# Patient Record
Sex: MALE | Race: WHITE | NOT HISPANIC OR LATINO | Employment: FULL TIME | URBAN - METROPOLITAN AREA
[De-identification: names, ages, dates, MRNs, and addresses within clinical notes are randomized per-mention and may not be internally consistent; named-entity substitution may affect disease eponyms.]

---

## 2017-11-17 DIAGNOSIS — M19.041 ARTHRITIS OF RIGHT HAND: Primary | ICD-10-CM

## 2017-11-21 PROBLEM — M19.041 PRIMARY OSTEOARTHRITIS OF RIGHT HAND: Status: ACTIVE | Noted: 2017-11-21

## 2017-11-28 PROBLEM — R52 PAIN: Status: ACTIVE | Noted: 2017-11-28

## 2017-11-28 PROBLEM — M25.60 RANGE OF MOTION DEFICIT: Status: ACTIVE | Noted: 2017-11-28

## 2018-01-10 PROBLEM — M25.60 RANGE OF MOTION DEFICIT: Status: RESOLVED | Noted: 2017-11-28 | Resolved: 2018-01-10

## 2021-05-19 ENCOUNTER — HOSPITAL ENCOUNTER (OUTPATIENT)
Dept: RADIOLOGY | Facility: HOSPITAL | Age: 66
Discharge: HOME | End: 2021-05-19
Attending: PODIATRIST
Payer: COMMERCIAL

## 2021-05-19 DIAGNOSIS — M79.672 PAIN IN LEFT FOOT: ICD-10-CM

## 2021-05-19 PROBLEM — S92.355A CLOSED NONDISPLACED FRACTURE OF FIFTH LEFT METATARSAL BONE: Status: ACTIVE | Noted: 2021-05-19

## 2021-05-19 PROCEDURE — 73630 XR FOOT COMPLETE 3 VIEW LEFT: ICD-10-PCS | Mod: 26,LT,, | Performed by: RADIOLOGY

## 2021-05-19 PROCEDURE — 73630 X-RAY EXAM OF FOOT: CPT | Mod: TC,PO,LT

## 2021-05-19 PROCEDURE — 73630 X-RAY EXAM OF FOOT: CPT | Mod: 26,LT,, | Performed by: RADIOLOGY

## 2022-12-14 PROBLEM — S83.262A PERIPHERAL TEAR OF LATERAL MENISCUS OF LEFT KNEE AS CURRENT INJURY: Status: ACTIVE | Noted: 2022-12-14

## 2024-02-28 PROBLEM — I10 HYPERTENSION: Chronic | Status: ACTIVE | Noted: 2024-02-28

## 2024-02-28 PROBLEM — Z71.89 ACP (ADVANCE CARE PLANNING): Status: ACTIVE | Noted: 2024-02-28

## 2024-02-28 PROBLEM — E87.29 HIGH ANION GAP METABOLIC ACIDOSIS: Status: ACTIVE | Noted: 2024-02-28

## 2024-02-28 PROBLEM — N17.9 ACUTE RENAL FAILURE: Status: ACTIVE | Noted: 2024-02-28

## 2024-03-01 PROBLEM — E83.39 HYPERPHOSPHATEMIA: Status: ACTIVE | Noted: 2024-03-01

## 2024-03-02 PROBLEM — E78.5 HYPERLIPIDEMIA: Status: ACTIVE | Noted: 2024-03-02

## 2024-03-11 PROBLEM — Z71.89 ACP (ADVANCE CARE PLANNING): Status: RESOLVED | Noted: 2024-02-28 | Resolved: 2024-03-11

## 2024-03-26 ENCOUNTER — TELEPHONE (OUTPATIENT)
Dept: NEPHROLOGY | Facility: CLINIC | Age: 69
End: 2024-03-26
Payer: MEDICARE

## 2024-03-26 DIAGNOSIS — N10 ACUTE INTERSTITIAL NEPHRITIS: Primary | ICD-10-CM

## 2024-03-26 NOTE — PROGRESS NOTES
Nephrology Plan of Care Note    Pt: Colton Ramachandran  : 1955  Date: 2024      Pre-visit labs orders placed      Johan Mtz MD  [facility] [place]

## 2024-03-26 NOTE — TELEPHONE ENCOUNTER
----- Message from Johan Mtz MD sent at 3/26/2024  4:37 PM CDT -----  Regarding: Coming off dialysis!!!  Mr. Ramachandran is coming off dialysis with renal recovery!!!    He will need a follow up with me in 3-4 weeks with labs prior to next visit. I placed his lab orders just now.     Thanks all,    ATR

## 2024-04-02 ENCOUNTER — OFFICE VISIT (OUTPATIENT)
Dept: VASCULAR SURGERY | Facility: CLINIC | Age: 69
End: 2024-04-02
Payer: MEDICARE

## 2024-04-02 DIAGNOSIS — N18.6 ESRD (END STAGE RENAL DISEASE): Primary | ICD-10-CM

## 2024-04-02 PROCEDURE — 36589 REMOVAL TUNNELED CV CATH: CPT | Mod: S$GLB,,, | Performed by: STUDENT IN AN ORGANIZED HEALTH CARE EDUCATION/TRAINING PROGRAM

## 2024-04-02 PROCEDURE — 4010F ACE/ARB THERAPY RXD/TAKEN: CPT | Mod: CPTII,S$GLB,, | Performed by: STUDENT IN AN ORGANIZED HEALTH CARE EDUCATION/TRAINING PROGRAM

## 2024-04-08 NOTE — PROGRESS NOTES
[facility]  Vascular Surgery  Procedure Note    SUMMARY       Procedure:   Removal of a right internal jugular tunneled catheter    * Surgery not found *    @ORCONDITIONALSMARTLINK(RONALDOLE,NCT,1,[facility])@    Pre-Operative Diagnosis:  ESRD    Post-Operative Diagnosis:   Same    Anesthesia:   Local - 1% Lidocaine    Description of Technical Procedures:   Consent was obtained prior to performing this procedure.  Patient is no longer need of the catheter so the plan is to remove the catheter to avoid any risk of infection and potential central stenosis    Patient's right chest and neck was prepped and draped in sterile fashion.  1% lidocaine was infiltrated into surrounding tissue.  With blunt dissection and spreading with scissors we are able to separate the cuff of the catheter from surrounding tissue.  The catheter was then removed and pressure was held at the base of the right neck.  The tip of the catheter was confirmed that it was retrieved.  The exit site was packed with a piece of gauze to prevent bleeding.  A pressure dressing was placed at the base of the neck.    Estimated Blood Loss (EBL): minimal    Specimens: none           Condition: Good    Disposition:   Patient was instructed to keep the pressure dressing on his neck for 24 hours and avoid showering during this period of time.  After 24 hours he can remove the packing from the exit site and the pressure dressing.  He was also advised to not lay supine for this 24 hour period to ensure the hemostasis is achieved.  Also instructed him that if there was any bleeding from the exit site to hold pressure at the base of the neck.    Follow up as needed

## 2024-04-23 ENCOUNTER — OFFICE VISIT (OUTPATIENT)
Dept: NEPHROLOGY | Facility: CLINIC | Age: 69
End: 2024-04-23
Payer: MEDICARE

## 2024-04-23 ENCOUNTER — TELEPHONE (OUTPATIENT)
Dept: NEPHROLOGY | Facility: CLINIC | Age: 69
End: 2024-04-23

## 2024-04-23 VITALS
BODY MASS INDEX: 42.96 KG/M2 | HEIGHT: 70 IN | HEART RATE: 72 BPM | OXYGEN SATURATION: 97 % | WEIGHT: 300.06 LBS | DIASTOLIC BLOOD PRESSURE: 72 MMHG | SYSTOLIC BLOOD PRESSURE: 150 MMHG

## 2024-04-23 DIAGNOSIS — I10 HYPERTENSION, UNSPECIFIED TYPE: Chronic | ICD-10-CM

## 2024-04-23 DIAGNOSIS — N10 ACUTE INTERSTITIAL NEPHRITIS: Primary | ICD-10-CM

## 2024-04-23 DIAGNOSIS — E66.01 CLASS 3 OBESITY: ICD-10-CM

## 2024-04-23 DIAGNOSIS — E78.5 HYPERLIPIDEMIA, UNSPECIFIED HYPERLIPIDEMIA TYPE: ICD-10-CM

## 2024-04-23 PROCEDURE — 4010F ACE/ARB THERAPY RXD/TAKEN: CPT | Mod: CPTII,S$GLB,, | Performed by: STUDENT IN AN ORGANIZED HEALTH CARE EDUCATION/TRAINING PROGRAM

## 2024-04-23 PROCEDURE — 99214 OFFICE O/P EST MOD 30 MIN: CPT | Mod: S$GLB,,, | Performed by: STUDENT IN AN ORGANIZED HEALTH CARE EDUCATION/TRAINING PROGRAM

## 2024-04-23 PROCEDURE — 1101F PT FALLS ASSESS-DOCD LE1/YR: CPT | Mod: CPTII,S$GLB,, | Performed by: STUDENT IN AN ORGANIZED HEALTH CARE EDUCATION/TRAINING PROGRAM

## 2024-04-23 PROCEDURE — 3077F SYST BP >= 140 MM HG: CPT | Mod: CPTII,S$GLB,, | Performed by: STUDENT IN AN ORGANIZED HEALTH CARE EDUCATION/TRAINING PROGRAM

## 2024-04-23 PROCEDURE — 3008F BODY MASS INDEX DOCD: CPT | Mod: CPTII,S$GLB,, | Performed by: STUDENT IN AN ORGANIZED HEALTH CARE EDUCATION/TRAINING PROGRAM

## 2024-04-23 PROCEDURE — 3066F NEPHROPATHY DOC TX: CPT | Mod: CPTII,S$GLB,, | Performed by: STUDENT IN AN ORGANIZED HEALTH CARE EDUCATION/TRAINING PROGRAM

## 2024-04-23 PROCEDURE — 3078F DIAST BP <80 MM HG: CPT | Mod: CPTII,S$GLB,, | Performed by: STUDENT IN AN ORGANIZED HEALTH CARE EDUCATION/TRAINING PROGRAM

## 2024-04-23 PROCEDURE — 3288F FALL RISK ASSESSMENT DOCD: CPT | Mod: CPTII,S$GLB,, | Performed by: STUDENT IN AN ORGANIZED HEALTH CARE EDUCATION/TRAINING PROGRAM

## 2024-04-23 PROCEDURE — 1159F MED LIST DOCD IN RCRD: CPT | Mod: CPTII,S$GLB,, | Performed by: STUDENT IN AN ORGANIZED HEALTH CARE EDUCATION/TRAINING PROGRAM

## 2024-04-23 PROCEDURE — 99999 PR PBB SHADOW E&M-EST. PATIENT-LVL III: CPT | Mod: PBBFAC,,, | Performed by: STUDENT IN AN ORGANIZED HEALTH CARE EDUCATION/TRAINING PROGRAM

## 2024-04-23 RX ORDER — OXYCODONE AND ACETAMINOPHEN 10; 325 MG/1; MG/1
1 TABLET ORAL EVERY 8 HOURS PRN
COMMUNITY
Start: 2024-04-18

## 2024-04-23 RX ORDER — PREDNISONE 5 MG/1
TABLET ORAL
Qty: 16 TABLET | Refills: 0 | Status: SHIPPED | OUTPATIENT
Start: 2024-04-23 | End: 2024-06-13

## 2024-04-23 NOTE — PROGRESS NOTES
Subjective:       Patient ID: Colton Ramachandran is a 69 y.o. White male who presents for new patient evaluation for chronic renal failure.  He was initially seen by the inpatient nephrology consult service in February of this year where he presented at request of his physician for abnormal labs.  In the emergency department his BUN/creatinine was 115/13.6 mg/dL, which was rapidly progressive over the course of the previous year.  He was initiated on hemodialysis after tunneled catheter placement during this hospital admission to stabilize the patient prior to CT-guided kidney biopsy on March 4, 2024.  He was then transitioned to the outpatient dialysis clinic for continued care.  His renal biopsy returned with acute interstitial nephritis and ATN.  He was placed on steroids with resultant recovery of renal function over the next few weeks.  He was discharged from dialysis at the end of March with improvement in recovery renal function.  Patient presents today for interval follow up.  He remains on steroid taper at this time and we will continue to down titrate over the next few weeks.    For acute interstitial nephritis-renal function is spontaneous recovering with steroids.  We believe is interstitial nephritis was secondary to aspirin, which was started in the year prior to his presentation.  The rest of his medicine list is typically benign for AIN causative pharmacologics.     Last lab was on 3/22/24 at [facility], BUN/Cr was 26/1.18mg/dL prior to dialysis treatment.       Review of Systems   Constitutional:  Negative for chills, diaphoresis and fever.   Respiratory:  Negative for cough and shortness of breath.    Cardiovascular:  Negative for chest pain and leg swelling.   Gastrointestinal:  Negative for abdominal pain, diarrhea, nausea and vomiting.   Genitourinary:  Negative for difficulty urinating, dysuria and hematuria.   Musculoskeletal:  Negative for myalgias.   Neurological:  Negative for headaches.    Hematological:  Does not bruise/bleed easily.   All other systems reviewed and are negative.      The past medical, family and social histories were reviewed for this encounter.     Past Medical History:   Diagnosis Date    Arthritis     Gout, unspecified     Hypertension      Past Surgical History:   Procedure Laterality Date    ANKLE FRACTURE SURGERY Right     HAND SURGERY Right     thumb    INSERTION, CATHETER, TUNNELED N/A 2/29/2024    Procedure: Insertion,catheter,tunneled;  Surgeon: Clint Shepherd MD;  Location: [facility];  Service: General;  Laterality: N/A;    KNEE ARTHROSCOPY W/ MENISCECTOMY Left 12/14/2022    Procedure: ARTHROSCOPY, KNEE, WITH PARTIAL LATERAL MENISCECTOMY;  Surgeon: Clint Garcia MD;  Location: [facility] [place] [facility];  Service: Orthopedics;  Laterality: Left;    LIPOMA RESECTION      mass from chest 1980s    TONSILLECTOMY       Social History     Socioeconomic History    Marital status: Legally    Tobacco Use    Smoking status: Never    Smokeless tobacco: Never   Substance and Sexual Activity    Alcohol use: Yes     Alcohol/week: 2.0 standard drinks of alcohol     Types: 2 Shots of liquor per week     Comment: occasionally    Drug use: Yes     Types: Hydrocodone, Oxycodone     Social Determinants of Health     Food Insecurity: No Food Insecurity (3/1/2024)    Hunger Vital Sign     Worried About Running Out of Food in the Last Year: Never true     Ran Out of Food in the Last Year: Never true   Transportation Needs: No Transportation Needs (3/1/2024)    PRAPARE - Transportation     Lack of Transportation (Medical): No     Lack of Transportation (Non-Medical): No   Housing Stability: Low Risk  (3/1/2024)    Housing Stability Vital Sign     Unable to Pay for Housing in the Last Year: No     Number of Places Lived in the Last Year: 1     Unstable Housing in the Last Year: No     Current Outpatient Medications   Medication Sig Dispense Refill    metoprolol tartrate (LOPRESSOR) 50 MG tablet Take 50  "mg by mouth 2 (two) times daily.      oxyCODONE-acetaminophen (PERCOCET)  mg per tablet Take 1 tablet by mouth every 8 (eight) hours as needed.      rosuvastatin (CRESTOR) 40 MG Tab Take 40 mg by mouth every evening.      predniSONE (DELTASONE) 5 MG tablet Take 2 tablets (10 mg total) by mouth every other day for 10 days, THEN 1 tablet (5 mg total) every other day for 10 days, THEN 0.5 tablets (2.5 mg total) every other day for 4 days. 16 tablet 0    sildenafiL (VIAGRA) 100 MG tablet Take 100 mg by mouth daily as needed for Erectile Dysfunction. (Patient not taking: Reported on 4/23/2024)       No current facility-administered medications for this visit.     BP (!) 150/72 (BP Location: Left arm, Patient Position: Sitting, BP Method: Large (Manual))   Pulse 72   Ht 5' 10" (1.778 m)   Wt (!) 136.1 kg (300 lb 0.7 oz)   SpO2 97%   BMI 43.05 kg/m²     Objective:      Physical Exam  Vitals reviewed.   Constitutional:       General: He is not in acute distress.     Appearance: Normal appearance.   HENT:      Head: Normocephalic and atraumatic.      Right Ear: External ear normal.      Left Ear: External ear normal.      Nose: Nose normal. No congestion.      Mouth/Throat:      Mouth: Mucous membranes are moist.      Pharynx: Oropharynx is clear. No oropharyngeal exudate or posterior oropharyngeal erythema.   Eyes:      General: No scleral icterus.     Extraocular Movements: Extraocular movements intact.   Cardiovascular:      Rate and Rhythm: Normal rate and regular rhythm.      Pulses: Normal pulses.      Heart sounds: Normal heart sounds.      No friction rub.   Pulmonary:      Effort: Pulmonary effort is normal. No respiratory distress.      Breath sounds: Normal breath sounds.   Abdominal:      General: Abdomen is flat.      Palpations: Abdomen is soft.   Musculoskeletal:         General: No swelling.      Cervical back: Normal range of motion. No tenderness.      Right lower leg: No edema.      Left lower " "leg: No edema.   Neurological:      General: No focal deficit present.      Mental Status: He is oriented to person, place, and time.      Motor: No weakness.   Psychiatric:         Mood and Affect: Mood normal.         Behavior: Behavior normal.         Assessment:     Lab Results   Component Value Date    CREATININE 3.52 (H) 03/05/2024    BUN 44 (H) 03/05/2024     03/05/2024    K 3.6 03/05/2024    CL 99 03/05/2024    CO2 26 03/05/2024     Lab Results   Component Value Date    .7 (H) 03/01/2024    CALCIUM 9.1 03/05/2024    PHOS 4.6 (H) 03/05/2024     Lab Results   Component Value Date    HCT 36.0 (L) 03/02/2024     Prot/Creat Ratio, Urine   Date Value Ref Range Status   02/28/2024 436.2 (H) 15.0 - 68.0 mg/g Final       No results found for: "MICALBCREAT"        1. Acute interstitial nephritis    2. Hypertension, unspecified type    3. Hyperlipidemia, unspecified hyperlipidemia type    4. Class 3 obesity        Plan:   Return to clinic in 6 weeks.  Labs for next visit include UACR and RFP  Baseline creatinine is 1.0mg/dL-1.2mg/dL.    Acute interstitial nephritis   -biopsy-proven 03/04/2024.  Believed secondary to aspirin use.  On steroid taper however rapidly tapering down.    -was able to come off dialysis by the end of March 2024  -continue to follow.  Return in 6 weeks with additional labs and urine protein assessment for staging    Hypertension   -okay with permissive hypertension at this time as he is currently tapering down steroids.  We will have him return off steroids to evaluate for underlying hypertension and medication needs    Hyperlipidemia-continue Crestor.  We discussed Mediterranean diet today.    Class 3 obesity- We discussed making lifestyle changes and using a Mediterranean diet for health benefits and weight loss.  This diet also is beneficial in improving HTN, DM, protein in urine as well as kidney function. aerobic exercise at least 3x weekly as tolerated      Johan Mtz, " MD [facility] [place]

## 2024-04-29 PROBLEM — N10 ACUTE INTERSTITIAL NEPHRITIS: Status: ACTIVE | Noted: 2024-04-29

## 2024-04-29 PROBLEM — E66.813 CLASS 3 OBESITY: Status: ACTIVE | Noted: 2024-04-29

## 2024-04-29 PROBLEM — E66.01 CLASS 3 OBESITY: Status: ACTIVE | Noted: 2024-04-29

## 2024-05-09 LAB
ALBUMIN SERPL-MCNC: 4 G/DL (ref 3.6–5.1)
ALBUMIN/CREAT UR: 9 MG/G CREAT
BUN SERPL-MCNC: 19 MG/DL (ref 7–25)
BUN/CREAT SERPL: NORMAL (CALC) (ref 6–22)
CALCIUM SERPL-MCNC: 9 MG/DL (ref 8.6–10.3)
CHLORIDE SERPL-SCNC: 102 MMOL/L (ref 98–110)
CO2 SERPL-SCNC: 29 MMOL/L (ref 20–32)
CREAT SERPL-MCNC: 1.28 MG/DL (ref 0.7–1.35)
CREAT UR-MCNC: 172 MG/DL (ref 20–320)
EGFR: 61 ML/MIN/1.73M2
GLUCOSE SERPL-MCNC: 98 MG/DL (ref 65–99)
MICROALBUMIN UR-MCNC: 1.5 MG/DL
PHOSPHATE SERPL-MCNC: 3.4 MG/DL (ref 2.1–4.3)
POTASSIUM SERPL-SCNC: 4.2 MMOL/L (ref 3.5–5.3)
SODIUM SERPL-SCNC: 139 MMOL/L (ref 135–146)

## 2024-06-03 PROBLEM — N17.9 AKI (ACUTE KIDNEY INJURY): Status: RESOLVED | Noted: 2024-02-28 | Resolved: 2024-06-03

## 2024-06-07 ENCOUNTER — TELEPHONE (OUTPATIENT)
Dept: NEPHROLOGY | Facility: CLINIC | Age: 69
End: 2024-06-07
Payer: MEDICARE

## 2024-06-07 NOTE — TELEPHONE ENCOUNTER
----- Message from Gill Cantu sent at 6/7/2024 12:45 PM CDT -----  Regarding: lab orders  Contact: pt  Type:  Needs Medical Advice    Who Called: pt  Would the patient rather a call back or a response via [facility]? Call back  Best Call Back Number: [phone]    Additional Information: he wants to know does he need to have more blood work done before his appt on 6/13/24 if so he needs orders

## 2024-06-13 ENCOUNTER — TELEPHONE (OUTPATIENT)
Dept: NEPHROLOGY | Facility: CLINIC | Age: 69
End: 2024-06-13

## 2024-06-13 ENCOUNTER — OFFICE VISIT (OUTPATIENT)
Dept: NEPHROLOGY | Facility: CLINIC | Age: 69
End: 2024-06-13
Payer: MEDICARE

## 2024-06-13 VITALS
HEART RATE: 71 BPM | SYSTOLIC BLOOD PRESSURE: 150 MMHG | OXYGEN SATURATION: 97 % | HEIGHT: 70 IN | DIASTOLIC BLOOD PRESSURE: 84 MMHG | BODY MASS INDEX: 43.05 KG/M2

## 2024-06-13 DIAGNOSIS — E66.01 CLASS 3 OBESITY: ICD-10-CM

## 2024-06-13 DIAGNOSIS — R79.89 ELEVATED SERUM CREATININE: ICD-10-CM

## 2024-06-13 DIAGNOSIS — N10 ACUTE INTERSTITIAL NEPHRITIS: Primary | ICD-10-CM

## 2024-06-13 PROCEDURE — 3066F NEPHROPATHY DOC TX: CPT | Mod: CPTII,S$GLB,, | Performed by: STUDENT IN AN ORGANIZED HEALTH CARE EDUCATION/TRAINING PROGRAM

## 2024-06-13 PROCEDURE — 3008F BODY MASS INDEX DOCD: CPT | Mod: CPTII,S$GLB,, | Performed by: STUDENT IN AN ORGANIZED HEALTH CARE EDUCATION/TRAINING PROGRAM

## 2024-06-13 PROCEDURE — 1101F PT FALLS ASSESS-DOCD LE1/YR: CPT | Mod: CPTII,S$GLB,, | Performed by: STUDENT IN AN ORGANIZED HEALTH CARE EDUCATION/TRAINING PROGRAM

## 2024-06-13 PROCEDURE — 1159F MED LIST DOCD IN RCRD: CPT | Mod: CPTII,S$GLB,, | Performed by: STUDENT IN AN ORGANIZED HEALTH CARE EDUCATION/TRAINING PROGRAM

## 2024-06-13 PROCEDURE — 3061F NEG MICROALBUMINURIA REV: CPT | Mod: CPTII,S$GLB,, | Performed by: STUDENT IN AN ORGANIZED HEALTH CARE EDUCATION/TRAINING PROGRAM

## 2024-06-13 PROCEDURE — 99999 PR PBB SHADOW E&M-EST. PATIENT-LVL II: CPT | Mod: PBBFAC,,, | Performed by: STUDENT IN AN ORGANIZED HEALTH CARE EDUCATION/TRAINING PROGRAM

## 2024-06-13 PROCEDURE — 3077F SYST BP >= 140 MM HG: CPT | Mod: CPTII,S$GLB,, | Performed by: STUDENT IN AN ORGANIZED HEALTH CARE EDUCATION/TRAINING PROGRAM

## 2024-06-13 PROCEDURE — 3079F DIAST BP 80-89 MM HG: CPT | Mod: CPTII,S$GLB,, | Performed by: STUDENT IN AN ORGANIZED HEALTH CARE EDUCATION/TRAINING PROGRAM

## 2024-06-13 PROCEDURE — 99213 OFFICE O/P EST LOW 20 MIN: CPT | Mod: S$GLB,,, | Performed by: STUDENT IN AN ORGANIZED HEALTH CARE EDUCATION/TRAINING PROGRAM

## 2024-06-13 PROCEDURE — 4010F ACE/ARB THERAPY RXD/TAKEN: CPT | Mod: CPTII,S$GLB,, | Performed by: STUDENT IN AN ORGANIZED HEALTH CARE EDUCATION/TRAINING PROGRAM

## 2024-06-13 PROCEDURE — 3288F FALL RISK ASSESSMENT DOCD: CPT | Mod: CPTII,S$GLB,, | Performed by: STUDENT IN AN ORGANIZED HEALTH CARE EDUCATION/TRAINING PROGRAM

## 2024-06-13 NOTE — PROGRESS NOTES
Subjective:       Patient ID: Colton Ramachandran is a 69 y.o. White male who presents for new patient evaluation for chronic renal failure.  He was initially seen by the inpatient nephrology consult service in February of this year where he presented at request of his physician for abnormal labs.  In the emergency department his BUN/creatinine was 115/13.6 mg/dL, which was rapidly progressive over the course of the previous year.  He was initiated on hemodialysis after tunneled catheter placement during this hospital admission to stabilize the patient prior to CT-guided kidney biopsy on March 4, 2024.  He was then transitioned to the outpatient dialysis clinic for continued care.  His renal biopsy returned with acute interstitial nephritis and ATN.  He was placed on steroids with resultant recovery of renal function over the next few weeks.  He was discharged from dialysis at the end of March with improvement in recovery renal function.  Patient presents today for interval follow up.  He remains on steroid taper at this time and we will continue to down titrate over the next few weeks.    For acute interstitial nephritis-renal function is spontaneous recovering with steroids.  We believe is interstitial nephritis was secondary to aspirin, which was started in the year prior to his presentation.  The rest of his medicine list is typically benign for AIN causative pharmacologics.     Last lab was on 3/22/24 at [facility], BUN/Cr was 26/1.18mg/dL prior to dialysis treatment.     Interval history:   06/13/2024 clinic visit-patient is doing well today.  He voices no new medical complaints since last visit.  He had issues with the lab collection and was only able to obtain a renal function panel and urine albumin to creatinine ratio.  Serum creatinine is 1.28 mg/dL with an EGFR of 61 per the CKD epi 2021 equation.  When factoring in his height and weight however his GFR approaches 89 mL a minute per the CKD epi 2021  equation.  His UA CR is undetectable. no other complaints today.    Review of Systems   Constitutional:  Negative for chills, diaphoresis and fever.   Respiratory:  Negative for cough and shortness of breath.    Cardiovascular:  Negative for chest pain and leg swelling.   Gastrointestinal:  Negative for abdominal pain, diarrhea, nausea and vomiting.   Genitourinary:  Negative for difficulty urinating, dysuria and hematuria.   Musculoskeletal:  Negative for myalgias.   Neurological:  Negative for headaches.   Hematological:  Does not bruise/bleed easily.   All other systems reviewed and are negative.      The past medical, family and social histories were reviewed for this encounter.     Past Medical History:   Diagnosis Date    Arthritis     Gout, unspecified     Hypertension      Past Surgical History:   Procedure Laterality Date    ANKLE FRACTURE SURGERY Right     HAND SURGERY Right     thumb    INSERTION, CATHETER, TUNNELED N/A 2/29/2024    Procedure: Insertion,catheter,tunneled;  Surgeon: Clint Shepherd MD;  Location: [facility];  Service: General;  Laterality: N/A;    KNEE ARTHROSCOPY W/ MENISCECTOMY Left 12/14/2022    Procedure: ARTHROSCOPY, KNEE, WITH PARTIAL LATERAL MENISCECTOMY;  Surgeon: Clint Garcia MD;  Location: [facility];  Service: Orthopedics;  Laterality: Left;    LIPOMA RESECTION      mass from chest 1980s    TONSILLECTOMY       Social History     Socioeconomic History    Marital status: Legally    Tobacco Use    Smoking status: Never    Smokeless tobacco: Never   Substance and Sexual Activity    Alcohol use: Yes     Alcohol/week: 2.0 standard drinks of alcohol     Types: 2 Shots of liquor per week     Comment: occasionally    Drug use: Yes     Types: Hydrocodone, Oxycodone     Social Determinants of Health     Food Insecurity: No Food Insecurity (3/1/2024)    Hunger Vital Sign     Worried About Running Out of Food in the Last Year: Never true     Ran Out of Food in the Last Year: Never true  "  Transportation Needs: No Transportation Needs (3/1/2024)    PRAPARE - Transportation     Lack of Transportation (Medical): No     Lack of Transportation (Non-Medical): No   Housing Stability: Low Risk  (3/1/2024)    Housing Stability Vital Sign     Unable to Pay for Housing in the Last Year: No     Number of Places Lived in the Last Year: 1     Unstable Housing in the Last Year: No     Current Outpatient Medications   Medication Sig    metoprolol tartrate (LOPRESSOR) 50 MG tablet Take 50 mg by mouth 2 (two) times daily.    oxyCODONE-acetaminophen (PERCOCET)  mg per tablet Take 1 tablet by mouth every 8 (eight) hours as needed.    rosuvastatin (CRESTOR) 40 MG Tab Take 40 mg by mouth every evening.    sildenafiL (VIAGRA) 100 MG tablet Take 100 mg by mouth daily as needed for Erectile Dysfunction.     No current facility-administered medications for this visit.     BP (!) 150/84 (BP Location: Left arm, Patient Position: Sitting, BP Method: Large (Manual))   Pulse 71   Ht 5' 10" (1.778 m)   SpO2 97%   BMI 43.05 kg/m²     Objective:      Physical Exam  Vitals reviewed.   Constitutional:       General: He is not in acute distress.     Appearance: Normal appearance. He is obese.   HENT:      Head: Normocephalic and atraumatic.   Eyes:      General: No scleral icterus.     Extraocular Movements: Extraocular movements intact.   Cardiovascular:      Rate and Rhythm: Normal rate and regular rhythm.      Pulses: Normal pulses.      Heart sounds: Normal heart sounds.      No friction rub.   Pulmonary:      Effort: Pulmonary effort is normal. No respiratory distress.      Breath sounds: Normal breath sounds.   Abdominal:      General: Abdomen is flat.      Palpations: Abdomen is soft.   Musculoskeletal:         General: No swelling.      Cervical back: Normal range of motion. No tenderness.      Right lower leg: No edema.      Left lower leg: No edema.   Neurological:      General: No focal deficit present.      " Mental Status: He is oriented to person, place, and time.      Motor: No weakness.   Psychiatric:         Mood and Affect: Mood normal.         Behavior: Behavior normal.         Assessment:     Lab Results   Component Value Date    CREATININE 1.28 05/08/2024    BUN 19 05/08/2024     05/08/2024    K 4.2 05/08/2024     05/08/2024    CO2 29 05/08/2024     Lab Results   Component Value Date    .7 (H) 03/01/2024    CALCIUM 9.0 05/08/2024    PHOS 4.6 (H) 03/05/2024     Lab Results   Component Value Date    HCT 36.0 (L) 03/02/2024     Prot/Creat Ratio, Urine   Date Value Ref Range Status   02/28/2024 436.2 (H) 15.0 - 68.0 mg/g Final       Lab Results   Component Value Date    MICALBCREAT 9 05/08/2024           1. Acute interstitial nephritis    2. Class 3 obesity    3. Elevated serum creatinine          Plan:   Return to clinic in 12 months  Labs for next visit include RF P, PTH, CBC, UA CR, U PCR, UA  Baseline creatinine is 1.0mg/dL-1.2mg/dL.    Acute interstitial nephritis   -biopsy-proven 03/04/2024.  Believed secondary to aspirin use.  On steroid taper however rapidly tapering down.    -was able to come off dialysis by the end of March 2024  -renal function at baseline.  EGFR based on his weight is 89 mL a minute without presence of albuminuria.  He does not meet traditional criteria for chronic kidney disease.    Hypertension   -off steroids.  Reports his blood pressure at home is well-controlled.  We will continue to monitor his blood pressure trends.  He can follow up PCP for further titration as well.  Reasonable to start him on a low-dose calcium channel blocker and follow up visit if he remains elevated.    Hyperlipidemia-continue Crestor.  We discussed Mediterranean diet today.    Class 3 obesity- We discussed making lifestyle changes and using a Mediterranean diet for health benefits and weight loss.  This diet also is beneficial in improving HTN, DM, protein in urine as well as kidney  function. aerobic exercise at least 3x weekly as tolerated      Johan Mtz MD  [facility]

## 2024-06-27 ENCOUNTER — TELEPHONE (OUTPATIENT)
Dept: NEPHROLOGY | Facility: CLINIC | Age: 69
End: 2024-06-27
Payer: MEDICARE

## 2024-06-27 NOTE — TELEPHONE ENCOUNTER
----- Message from Quique Posada sent at 6/27/2024 12:18 PM CDT -----  Type:  Needs Medical Advice    Who Called: PT    Symptoms (please be specific): Gout       Would the patient rather a call back or a response via [facility]? Call back    Best Call Back Number: [phone]      Additional Information: Sts at last visit Dr Mtz told him of an OTC medicine he can get for gout and he forgot what it was wants to know if he can get a call back to tell him.   Please advise -- Thank you

## 2024-06-27 NOTE — TELEPHONE ENCOUNTER
I emailed patient's after visit summary to the patient frol the last follow up. It contained the information regarding the OTC medication.Addressed.

## 2025-06-16 ENCOUNTER — OFFICE VISIT (OUTPATIENT)
Dept: NEPHROLOGY | Facility: CLINIC | Age: 70
End: 2025-06-16
Payer: MEDICARE

## 2025-06-16 VITALS
SYSTOLIC BLOOD PRESSURE: 150 MMHG | HEART RATE: 70 BPM | OXYGEN SATURATION: 96 % | HEIGHT: 70 IN | WEIGHT: 300.06 LBS | DIASTOLIC BLOOD PRESSURE: 100 MMHG | BODY MASS INDEX: 42.96 KG/M2

## 2025-06-16 DIAGNOSIS — E78.5 HYPERLIPIDEMIA, UNSPECIFIED HYPERLIPIDEMIA TYPE: ICD-10-CM

## 2025-06-16 DIAGNOSIS — R79.89 ELEVATED SERUM CREATININE: Primary | ICD-10-CM

## 2025-06-16 DIAGNOSIS — N10 ACUTE INTERSTITIAL NEPHRITIS: ICD-10-CM

## 2025-06-16 DIAGNOSIS — E66.01 MORBID (SEVERE) OBESITY DUE TO EXCESS CALORIES: ICD-10-CM

## 2025-06-16 DIAGNOSIS — I10 HYPERTENSION, UNSPECIFIED TYPE: ICD-10-CM

## 2025-06-16 PROCEDURE — 3077F SYST BP >= 140 MM HG: CPT | Mod: CPTII,S$GLB,, | Performed by: STUDENT IN AN ORGANIZED HEALTH CARE EDUCATION/TRAINING PROGRAM

## 2025-06-16 PROCEDURE — 3066F NEPHROPATHY DOC TX: CPT | Mod: CPTII,S$GLB,, | Performed by: STUDENT IN AN ORGANIZED HEALTH CARE EDUCATION/TRAINING PROGRAM

## 2025-06-16 PROCEDURE — 3008F BODY MASS INDEX DOCD: CPT | Mod: CPTII,S$GLB,, | Performed by: STUDENT IN AN ORGANIZED HEALTH CARE EDUCATION/TRAINING PROGRAM

## 2025-06-16 PROCEDURE — 99214 OFFICE O/P EST MOD 30 MIN: CPT | Mod: S$GLB,,, | Performed by: STUDENT IN AN ORGANIZED HEALTH CARE EDUCATION/TRAINING PROGRAM

## 2025-06-16 PROCEDURE — 99999 PR PBB SHADOW E&M-EST. PATIENT-LVL III: CPT | Mod: PBBFAC,,, | Performed by: STUDENT IN AN ORGANIZED HEALTH CARE EDUCATION/TRAINING PROGRAM

## 2025-06-16 PROCEDURE — 3080F DIAST BP >= 90 MM HG: CPT | Mod: CPTII,S$GLB,, | Performed by: STUDENT IN AN ORGANIZED HEALTH CARE EDUCATION/TRAINING PROGRAM

## 2025-06-16 PROCEDURE — 3060F POS MICROALBUMINURIA REV: CPT | Mod: CPTII,S$GLB,, | Performed by: STUDENT IN AN ORGANIZED HEALTH CARE EDUCATION/TRAINING PROGRAM

## 2025-06-16 PROCEDURE — 1159F MED LIST DOCD IN RCRD: CPT | Mod: CPTII,S$GLB,, | Performed by: STUDENT IN AN ORGANIZED HEALTH CARE EDUCATION/TRAINING PROGRAM

## 2025-06-16 RX ORDER — LOSARTAN POTASSIUM AND HYDROCHLOROTHIAZIDE 12.5; 5 MG/1; MG/1
1 TABLET ORAL DAILY
Qty: 30 TABLET | Refills: 11 | Status: SHIPPED | OUTPATIENT
Start: 2025-06-16 | End: 2026-06-11

## 2025-06-16 NOTE — PROGRESS NOTES
[facility]  Nephrology Clinic    Subjective:       HPI ID: Colton Ramachandran is a 70 y.o. male who presents for new patient evaluation for chronic renal failure.  He was initially seen by the inpatient nephrology consult service in February of this year where he presented at request of his physician for abnormal labs.  In the emergency department his BUN/creatinine was 115/13.6 mg/dL, which was rapidly progressive over the course of the previous year.  He was initiated on hemodialysis after tunneled catheter placement during this hospital admission to stabilize the patient prior to CT-guided kidney biopsy on March 4, 2024.  He was then transitioned to the outpatient dialysis clinic for continued care.  His renal biopsy returned with acute interstitial nephritis and ATN.  He was placed on steroids with resultant recovery of renal function over the next few weeks.  He was discharged from dialysis at the end of March with improvement in recovery renal function.  Patient presents today for interval follow up.  He remains on steroid taper at this time and we will continue to down titrate over the next few weeks.    For acute interstitial nephritis-renal function is spontaneous recovering with steroids.  We believe is interstitial nephritis was secondary to aspirin, which was started in the year prior to his presentation.  The rest of his medicine list is typically benign for AIN causative pharmacologics.     Last lab was on 3/22/24 at [facility], BUN/Cr was 26/1.18mg/dL prior to dialysis treatment.     Interval history:   06/13/2024 clinic visit-patient is doing well today.  He voices no new medical complaints since last visit.  He had issues with the lab collection and was only able to obtain a renal function panel and urine albumin to creatinine ratio.  Serum creatinine is 1.28 mg/dL with an EGFR of 61 per the CKD epi 2021 equation.  When factoring in his height and weight however his GFR  approaches 89 mL a minute per the CKD epi 2021 equation.  His UA CR is undetectable. no other complaints today.    6/16/25 - here for annual follow up. Voices no new medical complaint. Reports he needs to lose some weight and only reports losing 5lbs since last visit. He has no uremic or urinary symptoms and is in his usual state of health. We reviewed recent labs at chairside. Renal function based on serum creatinine trend remains at baseline.        Review of Systems   Constitutional:  Negative for chills, diaphoresis and fever.   Respiratory:  Negative for cough and shortness of breath.    Cardiovascular:  Negative for chest pain and leg swelling.   Gastrointestinal:  Negative for abdominal pain, diarrhea, nausea and vomiting.   Genitourinary:  Negative for difficulty urinating, dysuria and hematuria.   Musculoskeletal:  Negative for myalgias.   Neurological:  Negative for headaches.   Hematological:  Does not bruise/bleed easily.   All other systems reviewed and are negative.      The past medical, family and social histories were reviewed for this encounter.     Past Medical History:   Diagnosis Date    Arthritis     Gout, unspecified     Hypertension      Past Surgical History:   Procedure Laterality Date    ANKLE FRACTURE SURGERY Right     HAND SURGERY Right     thumb    INSERTION, CATHETER, TUNNELED N/A 2/29/2024    Procedure: Insertion,catheter,tunneled;  Surgeon: Clint Shepherd MD;  Location: [facility] OR;  Service: General;  Laterality: N/A;    KNEE ARTHROSCOPY W/ MENISCECTOMY Left 12/14/2022    Procedure: ARTHROSCOPY, KNEE, WITH PARTIAL LATERAL MENISCECTOMY;  Surgeon: Clint Garcia MD;  Location: [facility] OR;  Service: Orthopedics;  Laterality: Left;    LIPOMA RESECTION      mass from chest 1980s    TONSILLECTOMY       Social History     Socioeconomic History    Marital status: Legally    Tobacco Use    Smoking status: Never    Smokeless tobacco: Never   Substance and Sexual Activity    Alcohol use:  "Yes     Alcohol/week: 2.0 standard drinks of alcohol     Types: 2 Shots of liquor per week     Comment: occasionally    Drug use: Yes     Types: Hydrocodone, Oxycodone     Social Drivers of Health     Food Insecurity: No Food Insecurity (3/1/2024)    Hunger Vital Sign     Worried About Running Out of Food in the Last Year: Never true     Ran Out of Food in the Last Year: Never true   Transportation Needs: No Transportation Needs (3/1/2024)    PRAPARE - Transportation     Lack of Transportation (Medical): No     Lack of Transportation (Non-Medical): No   Housing Stability: Low Risk  (3/1/2024)    Housing Stability Vital Sign     Unable to Pay for Housing in the Last Year: No     Number of Places Lived in the Last Year: 1     Unstable Housing in the Last Year: No     Current Outpatient Medications   Medication Sig    metoprolol tartrate (LOPRESSOR) 50 MG tablet Take 50 mg by mouth 2 (two) times daily.    oxyCODONE-acetaminophen (PERCOCET)  mg per tablet Take 1 tablet by mouth every 8 (eight) hours as needed.    rosuvastatin (CRESTOR) 40 MG Tab Take 40 mg by mouth every evening.    sildenafiL (VIAGRA) 100 MG tablet Take 100 mg by mouth daily as needed for Erectile Dysfunction.    albuterol (PROVENTIL/VENTOLIN HFA) 90 mcg/actuation inhaler Inhale 2 puffs into the lungs every 6 (six) hours as needed for Wheezing.    losartan-hydrochlorothiazide 50-12.5 mg (HYZAAR) 50-12.5 mg per tablet Take 1 tablet by mouth once daily.     No current facility-administered medications for this visit.     BP (!) 150/100 (BP Location: Left arm, Patient Position: Sitting)   Pulse 70   Ht 5' 10" (1.778 m)   Wt (!) 136.1 kg (300 lb 0.7 oz)   SpO2 96%   BMI 43.05 kg/m²     Objective:      Physical Exam  Vitals reviewed.   Constitutional:       General: He is not in acute distress.     Appearance: Normal appearance. He is obese.   HENT:      Head: Normocephalic and atraumatic.   Cardiovascular:      Pulses: Normal pulses.      Heart " sounds: Normal heart sounds.      No friction rub.   Pulmonary:      Effort: Pulmonary effort is normal. No respiratory distress.      Breath sounds: Normal breath sounds.   Abdominal:      General: Abdomen is flat.      Palpations: Abdomen is soft.   Musculoskeletal:         General: No swelling.      Right lower leg: No edema.      Left lower leg: No edema.   Neurological:      Mental Status: He is oriented to person, place, and time.      Motor: No weakness.   Psychiatric:         Mood and Affect: Mood normal.         Behavior: Behavior normal.         Assessment:     Lab Results   Component Value Date    CREATININE 1.22 06/16/2025    BUN 20 06/16/2025     06/16/2025    K 4.3 06/16/2025     06/16/2025    CO2 23 06/16/2025     Lab Results   Component Value Date    PTH 69.1 06/16/2025    CALCIUM 9.2 06/16/2025    PHOS 3.1 06/16/2025     Lab Results   Component Value Date    HCT 46.5 06/16/2025     Prot/Creat Ratio, Urine   Date Value Ref Range Status   06/16/2025 0.5 (H) 0.0 - 0.2 Final   02/28/2024 436.2 (H) 15.0 - 68.0 mg/g Final       Lab Results   Component Value Date    MICALBCREAT 154.9 (H) 06/16/2025    MICALBCREAT 9 05/08/2024           1. Elevated serum creatinine    2. Morbid (severe) obesity due to excess calories    3. Hypertension, unspecified type    4. Hyperlipidemia, unspecified hyperlipidemia type    5. Acute interstitial nephritis            Plan:   Return to clinic in 6 months  Baseline creatinine is 1.0mg/dL-1.2mg/dL.  Orders Placed This Encounter   Procedures    CBC Auto Differential    Microalbumin/Creatinine Ratio, Urine    Comprehensive Metabolic Panel    Magnesium    Phosphorus    PTH, Intact    Protein/Creatinine Ratio, Urine    Urinalysis       Acute interstitial nephritis   -biopsy-proven 03/04/2024.  Believed secondary to aspirin use. Since recovered.   -was able to come off dialysis by the end of March 2024  -renal function at baseline.  EGFR based on his weight is 89 mL a  minute without presence of albuminuria.  He does not meet traditional criteria for chronic kidney disease.  -given h/o of AIN believed d/t aspirin use would avoid if possible. Prefer he be prescribed alternative anti-platelets for future need    Hypertension   -remains elevated today w/ high diastolic  -start dual ARB/HCTZ class today.     Hyperlipidemia-continue Crestor.  We discussed Mediterranean diet today.    Class 3 obesity- We again discussed making lifestyle changes and using a Mediterranean diet for health benefits and weight loss.     __________________________  Johan Mtz MD  [facility]    Part of this note has been created using [employer] dictation system. Errors in transcription may not be completely avoided.